# Patient Record
Sex: FEMALE | Race: BLACK OR AFRICAN AMERICAN | URBAN - METROPOLITAN AREA
[De-identification: names, ages, dates, MRNs, and addresses within clinical notes are randomized per-mention and may not be internally consistent; named-entity substitution may affect disease eponyms.]

---

## 2023-04-25 ENCOUNTER — TELEPHONE ENCOUNTER (OUTPATIENT)
Dept: URBAN - METROPOLITAN AREA CLINIC 92 | Facility: CLINIC | Age: 53
End: 2023-04-25

## 2023-05-19 ENCOUNTER — WEB ENCOUNTER (OUTPATIENT)
Dept: URBAN - METROPOLITAN AREA CLINIC 17 | Facility: CLINIC | Age: 53
End: 2023-05-19

## 2023-05-25 ENCOUNTER — LAB OUTSIDE AN ENCOUNTER (OUTPATIENT)
Dept: URBAN - METROPOLITAN AREA CLINIC 17 | Facility: CLINIC | Age: 53
End: 2023-05-25

## 2023-05-25 ENCOUNTER — OFFICE VISIT (OUTPATIENT)
Dept: URBAN - METROPOLITAN AREA CLINIC 17 | Facility: CLINIC | Age: 53
End: 2023-05-25
Payer: COMMERCIAL

## 2023-05-25 VITALS
BODY MASS INDEX: 25.98 KG/M2 | WEIGHT: 171.4 LBS | HEIGHT: 68 IN | DIASTOLIC BLOOD PRESSURE: 97 MMHG | SYSTOLIC BLOOD PRESSURE: 169 MMHG | TEMPERATURE: 97.8 F | HEART RATE: 72 BPM

## 2023-05-25 DIAGNOSIS — R10.13 DYSPEPSIA: ICD-10-CM

## 2023-05-25 DIAGNOSIS — Z80.0 FAMILY HISTORY OF COLON CANCER IN FATHER: ICD-10-CM

## 2023-05-25 DIAGNOSIS — R19.4 CHANGE IN BOWEL HABITS: ICD-10-CM

## 2023-05-25 DIAGNOSIS — M94.0 COSTOCHONDRITIS: ICD-10-CM

## 2023-05-25 PROCEDURE — 99244 OFF/OP CNSLTJ NEW/EST MOD 40: CPT | Performed by: INTERNAL MEDICINE

## 2023-05-25 PROCEDURE — 99204 OFFICE O/P NEW MOD 45 MIN: CPT | Performed by: INTERNAL MEDICINE

## 2023-05-25 RX ORDER — POLYETHYLENE GLYCOL-3350 AND ELECTROLYTES WITH FLAVOR PACK 240; 5.84; 2.98; 6.72; 22.72 G/278.26G; G/278.26G; G/278.26G; G/278.26G; G/278.26G
AS DIRECTED POWDER, FOR SOLUTION ORAL 1
Qty: 1 | Refills: 0 | OUTPATIENT
Start: 2023-05-25 | End: 2023-05-26

## 2023-05-25 NOTE — HPI-TODAY'S VISIT:
5/25/23 51 yo lady pt of Dr Patricio. REferred for several reasons including dyspepsia. fhx of colon CA. A copy of this note will be sent to her.  Her colonoscopy is scheduled for next week.  fhx of colon CA in dad at age 55.  She has irregular BMs. no blood in stool Went for tummy tuck consultation and told she had a ventral hernia c/o presternal discomfort at lower end of sternum off and on for a year, usually when she is exercises. Feels like something is coming up. Massages it back down. No N/V/HB.  No pain with eating.  She is bloated a lot. Thinks may be related to being premenopausal. Worse in the evenings.  Has a BM about twice a week - this is a change from prior. Used to be more frequent about a year ago.  No changes in diet but says eating worse due to stress.  Uses nsaids occasionally.

## 2023-05-30 ENCOUNTER — CLAIMS CREATED FROM THE CLAIM WINDOW (OUTPATIENT)
Dept: URBAN - METROPOLITAN AREA CLINIC 4 | Facility: CLINIC | Age: 53
End: 2023-05-30
Payer: COMMERCIAL

## 2023-05-30 ENCOUNTER — OFFICE VISIT (OUTPATIENT)
Dept: URBAN - METROPOLITAN AREA SURGERY CENTER 16 | Facility: SURGERY CENTER | Age: 53
End: 2023-05-30
Payer: COMMERCIAL

## 2023-05-30 DIAGNOSIS — Z12.11 COLON CANCER SCREENING: ICD-10-CM

## 2023-05-30 DIAGNOSIS — K31.89 OTHER DISEASES OF STOMACH AND DUODENUM: ICD-10-CM

## 2023-05-30 DIAGNOSIS — K90.0 CELIAC DISEASE: ICD-10-CM

## 2023-05-30 DIAGNOSIS — K31.A0 GASTRIC INTESTINAL METAPLASIA, UNSPECIFIED: ICD-10-CM

## 2023-05-30 DIAGNOSIS — K30 DYSPEPSIA: ICD-10-CM

## 2023-05-30 DIAGNOSIS — K63.89 OTHER SPECIFIED DISEASES OF INTESTINE: ICD-10-CM

## 2023-05-30 DIAGNOSIS — Z80.0 FAMILY HISTORY OF CANCER OF DIGESTIVE SYSTEM: ICD-10-CM

## 2023-05-30 DIAGNOSIS — D17.5 LIPOMA OF COLON: ICD-10-CM

## 2023-05-30 DIAGNOSIS — K29.60 OTHER GASTRITIS WITHOUT BLEEDING: ICD-10-CM

## 2023-05-30 PROCEDURE — G8907 PT DOC NO EVENTS ON DISCHARG: HCPCS | Performed by: INTERNAL MEDICINE

## 2023-05-30 PROCEDURE — 88305 TISSUE EXAM BY PATHOLOGIST: CPT | Performed by: PATHOLOGY

## 2023-05-30 PROCEDURE — 88342 IMHCHEM/IMCYTCHM 1ST ANTB: CPT | Performed by: PATHOLOGY

## 2023-05-30 PROCEDURE — 45380 COLONOSCOPY AND BIOPSY: CPT | Performed by: INTERNAL MEDICINE

## 2023-05-30 PROCEDURE — 43239 EGD BIOPSY SINGLE/MULTIPLE: CPT | Performed by: INTERNAL MEDICINE

## 2023-05-30 PROCEDURE — 88312 SPECIAL STAINS GROUP 1: CPT | Performed by: PATHOLOGY

## 2023-06-07 ENCOUNTER — TELEPHONE ENCOUNTER (OUTPATIENT)
Dept: URBAN - METROPOLITAN AREA CLINIC 105 | Facility: CLINIC | Age: 53
End: 2023-06-07

## 2023-06-11 LAB
ENDOMYSIAL ANTIBODY IGA: NEGATIVE
H PYLORI BREATH TEST: NEGATIVE
IMMUNOGLOBULIN A, QN, SERUM: 308
T-TRANSGLUTAMINASE (TTG) IGA: <2

## 2023-06-26 ENCOUNTER — WEB ENCOUNTER (OUTPATIENT)
Dept: URBAN - METROPOLITAN AREA CLINIC 17 | Facility: CLINIC | Age: 53
End: 2023-06-26

## 2023-06-26 ENCOUNTER — OFFICE VISIT (OUTPATIENT)
Dept: URBAN - METROPOLITAN AREA CLINIC 17 | Facility: CLINIC | Age: 53
End: 2023-06-26
Payer: COMMERCIAL

## 2023-06-26 VITALS
HEART RATE: 73 BPM | HEIGHT: 68 IN | TEMPERATURE: 97.2 F | BODY MASS INDEX: 26.76 KG/M2 | SYSTOLIC BLOOD PRESSURE: 147 MMHG | WEIGHT: 176.6 LBS | DIASTOLIC BLOOD PRESSURE: 92 MMHG

## 2023-06-26 DIAGNOSIS — R10.13 DYSPEPSIA: ICD-10-CM

## 2023-06-26 DIAGNOSIS — R14.0 BLOATING: ICD-10-CM

## 2023-06-26 DIAGNOSIS — Z80.0 FAMILY HISTORY OF COLON CANCER IN FATHER: ICD-10-CM

## 2023-06-26 DIAGNOSIS — M94.0 COSTOCHONDRITIS: ICD-10-CM

## 2023-06-26 DIAGNOSIS — R19.4 CHANGE IN BOWEL HABITS: ICD-10-CM

## 2023-06-26 PROCEDURE — 99213 OFFICE O/P EST LOW 20 MIN: CPT | Performed by: INTERNAL MEDICINE

## 2023-06-26 NOTE — HPI-TODAY'S VISIT:
5/25/23 53 yo lady pt of Dr Patricio. REferred for several reasons including dyspepsia. fhx of colon CA. A copy of this note will be sent to her.  Her colonoscopy is scheduled for next week.  fhx of colon CA in dad at age 55.  She has irregular BMs. no blood in stool Went for tummy tuck consultation and told she had a ventral hernia c/o presternal discomfort at lower end of sternum off and on for a year, usually when she is exercises. Feels like something is coming up. Massages it back down. No N/V/HB.  No pain with eating.  She is bloated a lot. Thinks may be related to being premenopausal. Worse in the evenings.  Has a BM about twice a week - this is a change from prior. Used to be more frequent about a year ago.  No changes in diet but says eating worse due to stress.  Uses nsaids occasionally.  6/26/23 Here for f.u visit Discussed EGD and colonoscopy and bx results.  Still some bloating. Has a BM about 3 days a week.  SAys she tried only one dose of miralax daily - suboptimal results.

## 2023-09-13 ENCOUNTER — DASHBOARD ENCOUNTERS (OUTPATIENT)
Age: 53
End: 2023-09-13

## 2023-09-25 ENCOUNTER — OFFICE VISIT (OUTPATIENT)
Dept: URBAN - METROPOLITAN AREA CLINIC 17 | Facility: CLINIC | Age: 53
End: 2023-09-25